# Patient Record
Sex: FEMALE | Race: WHITE | NOT HISPANIC OR LATINO | Employment: OTHER | URBAN - METROPOLITAN AREA
[De-identification: names, ages, dates, MRNs, and addresses within clinical notes are randomized per-mention and may not be internally consistent; named-entity substitution may affect disease eponyms.]

---

## 2021-04-13 DIAGNOSIS — Z23 ENCOUNTER FOR IMMUNIZATION: ICD-10-CM

## 2021-04-23 ENCOUNTER — APPOINTMENT (EMERGENCY)
Dept: RADIOLOGY | Facility: HOSPITAL | Age: 71
End: 2021-04-23
Payer: MEDICARE

## 2021-04-23 ENCOUNTER — HOSPITAL ENCOUNTER (EMERGENCY)
Facility: HOSPITAL | Age: 71
Discharge: HOME | End: 2021-04-23
Attending: EMERGENCY MEDICINE
Payer: MEDICARE

## 2021-04-23 VITALS
BODY MASS INDEX: 20.46 KG/M2 | RESPIRATION RATE: 12 BRPM | SYSTOLIC BLOOD PRESSURE: 199 MMHG | DIASTOLIC BLOOD PRESSURE: 122 MMHG | HEIGHT: 68 IN | OXYGEN SATURATION: 97 % | HEART RATE: 87 BPM | TEMPERATURE: 97.7 F | WEIGHT: 135 LBS

## 2021-04-23 DIAGNOSIS — S62.632B OPEN DISPLACED FRACTURE OF DISTAL PHALANX OF RIGHT MIDDLE FINGER, INITIAL ENCOUNTER: ICD-10-CM

## 2021-04-23 DIAGNOSIS — T14.8XXA ANIMAL BITE: ICD-10-CM

## 2021-04-23 DIAGNOSIS — S61.312A LACERATION OF RIGHT MIDDLE FINGER WITHOUT FOREIGN BODY WITH DAMAGE TO NAIL, INITIAL ENCOUNTER: Primary | ICD-10-CM

## 2021-04-23 PROCEDURE — 90471 IMMUNIZATION ADMIN: CPT

## 2021-04-23 PROCEDURE — 99283 EMERGENCY DEPT VISIT LOW MDM: CPT

## 2021-04-23 PROCEDURE — 99284 EMERGENCY DEPT VISIT MOD MDM: CPT | Performed by: NURSE PRACTITIONER

## 2021-04-23 PROCEDURE — 12042 INTMD RPR N-HF/GENIT2.6-7.5: CPT | Performed by: NURSE PRACTITIONER

## 2021-04-23 PROCEDURE — 73140 X-RAY EXAM OF FINGER(S): CPT

## 2021-04-23 PROCEDURE — 90715 TDAP VACCINE 7 YRS/> IM: CPT | Performed by: NURSE PRACTITIONER

## 2021-04-23 RX ORDER — HYDROCODONE BITARTRATE AND ACETAMINOPHEN 5; 325 MG/1; MG/1
1 TABLET ORAL ONCE
Status: COMPLETED | OUTPATIENT
Start: 2021-04-23 | End: 2021-04-23

## 2021-04-23 RX ORDER — BUPIVACAINE HYDROCHLORIDE 5 MG/ML
5 INJECTION, SOLUTION EPIDURAL; INTRACAUDAL ONCE
Status: COMPLETED | OUTPATIENT
Start: 2021-04-23 | End: 2021-04-23

## 2021-04-23 RX ORDER — AMOXICILLIN AND CLAVULANATE POTASSIUM 875; 125 MG/1; MG/1
1 TABLET, FILM COATED ORAL ONCE
Status: COMPLETED | OUTPATIENT
Start: 2021-04-23 | End: 2021-04-23

## 2021-04-23 RX ORDER — HYDROCODONE BITARTRATE AND ACETAMINOPHEN 5; 325 MG/1; MG/1
1 TABLET ORAL EVERY 6 HOURS PRN
Qty: 12 TABLET | Refills: 0 | Status: SHIPPED | OUTPATIENT
Start: 2021-04-23

## 2021-04-23 RX ORDER — LIDOCAINE HYDROCHLORIDE 10 MG/ML
5 INJECTION, SOLUTION EPIDURAL; INFILTRATION; INTRACAUDAL; PERINEURAL ONCE
Status: COMPLETED | OUTPATIENT
Start: 2021-04-23 | End: 2021-04-23

## 2021-04-23 RX ORDER — DIAZEPAM 5 MG/1
5 TABLET ORAL EVERY 6 HOURS PRN
Status: DISCONTINUED | OUTPATIENT
Start: 2021-04-23 | End: 2021-04-24 | Stop reason: HOSPADM

## 2021-04-23 RX ORDER — AMOXICILLIN AND CLAVULANATE POTASSIUM 875; 125 MG/1; MG/1
1 TABLET, FILM COATED ORAL 2 TIMES DAILY
Qty: 20 TABLET | Refills: 0 | Status: SHIPPED | OUTPATIENT
Start: 2021-04-23 | End: 2021-05-03

## 2021-04-23 RX ADMIN — LIDOCAINE HYDROCHLORIDE 5 ML: 10 INJECTION, SOLUTION EPIDURAL; INFILTRATION; INTRACAUDAL; PERINEURAL at 21:45

## 2021-04-23 RX ADMIN — DIAZEPAM 5 MG: 5 TABLET ORAL at 21:45

## 2021-04-23 RX ADMIN — HYDROCODONE BITARTRATE AND ACETAMINOPHEN 1 TABLET: 5; 325 TABLET ORAL at 22:46

## 2021-04-23 RX ADMIN — BUPIVACAINE HYDROCHLORIDE 5 ML: 5 INJECTION, SOLUTION EPIDURAL; INTRACAUDAL; PERINEURAL at 21:45

## 2021-04-23 RX ADMIN — TETANUS TOXOID, REDUCED DIPHTHERIA TOXOID AND ACELLULAR PERTUSSIS VACCINE, ADSORBED 0.5 ML: 5; 2.5; 8; 8; 2.5 SUSPENSION INTRAMUSCULAR at 21:45

## 2021-04-23 RX ADMIN — AMOXICILLIN AND CLAVULANATE POTASSIUM 1 TABLET: 875; 125 TABLET, FILM COATED ORAL at 22:46

## 2021-04-24 ENCOUNTER — TELEPHONE (OUTPATIENT)
Dept: OBGYN CLINIC | Facility: HOSPITAL | Age: 71
End: 2021-04-24

## 2021-04-24 NOTE — ED PROVIDER NOTES
History  Chief Complaint   Patient presents with    Dog Bite     PT states she was reaching into the dogs mouth when the dog bit the tip of her right middle finger   Finger Laceration     This is a 61-year-old female that accidentally sustained a dog bite to her right middle finger  It was her own dog she was reaching to take a bone out of the dog's mouth that he was choking on  Somehow her finger had been bitten  She has a near complete distal finger tip avulsion  Will obtain films to evaluate for osseous injury  There was damage to the nail as well      Dog Bite  Associated symptoms: no fever and no rash    Finger Laceration  Associated symptoms: no fever and no rash        None       History reviewed  No pertinent past medical history  History reviewed  No pertinent surgical history  History reviewed  No pertinent family history  I have reviewed and agree with the history as documented  E-Cigarette/Vaping     E-Cigarette/Vaping Substances     Social History     Tobacco Use    Smoking status: Light Tobacco Smoker    Smokeless tobacco: Never Used   Substance Use Topics    Alcohol use: Yes     Frequency: Monthly or less     Drinks per session: 1 or 2    Drug use: Not on file       Review of Systems   Constitutional: Negative for diaphoresis, fatigue and fever  HENT: Negative for congestion, ear pain, nosebleeds and sore throat  Eyes: Negative for photophobia, pain, discharge and visual disturbance  Respiratory: Negative for cough, choking, chest tightness, shortness of breath and wheezing  Cardiovascular: Negative for chest pain and palpitations  Gastrointestinal: Negative for abdominal distention, abdominal pain, diarrhea and vomiting  Genitourinary: Negative for dysuria, flank pain and frequency  Musculoskeletal: Negative for back pain, gait problem and joint swelling  Skin: Positive for wound  Negative for color change and rash     Neurological: Negative for dizziness, syncope and headaches  Psychiatric/Behavioral: Negative for behavioral problems and confusion  The patient is not nervous/anxious  All other systems reviewed and are negative  Physical Exam  Physical Exam  Vitals signs and nursing note reviewed  Constitutional:       General: She is not in acute distress  Appearance: She is well-developed  She is not ill-appearing or toxic-appearing  HENT:      Head: Normocephalic and atraumatic  Mouth/Throat:      Dentition: Normal dentition  Eyes:      General:         Right eye: No discharge  Left eye: No discharge  Neck:      Musculoskeletal: Normal range of motion and neck supple  Cardiovascular:      Rate and Rhythm: Normal rate and regular rhythm  Pulmonary:      Effort: Pulmonary effort is normal  No accessory muscle usage or respiratory distress  Abdominal:      General: There is no distension  Tenderness: There is no guarding  Musculoskeletal: Normal range of motion  Skin:     General: Skin is warm and dry  Comments: There is skin avulsion to the right middle finger distally with approximately half of the distal finger tip avulsed  Neurological:      Mental Status: She is alert and oriented to person, place, and time  Coordination: Coordination normal    Psychiatric:         Behavior: Behavior is cooperative           Vital Signs  ED Triage Vitals   Temperature Pulse Respirations Blood Pressure SpO2   04/23/21 2100 04/23/21 2100 04/23/21 2100 04/23/21 2100 04/23/21 2100   97 7 °F (36 5 °C) 87 12 (!) 199/122 97 %      Temp Source Heart Rate Source Patient Position - Orthostatic VS BP Location FiO2 (%)   04/23/21 2100 04/23/21 2100 04/23/21 2100 04/23/21 2100 --   Oral Monitor Sitting Left arm       Pain Score       04/23/21 2246       6           Vitals:    04/23/21 2100   BP: (!) 199/122   Pulse: 87   Patient Position - Orthostatic VS: Sitting         Visual Acuity      ED Medications  Medications   diazepam (VALIUM) tablet 5 mg (5 mg Oral Given 4/23/21 2145)   bupivacaine (PF) (MARCAINE) 0 5 % injection 5 mL (5 mL Infiltration Given 4/23/21 2145)   tetanus-diphtheria-acellular pertussis (BOOSTRIX) IM injection 0 5 mL (0 5 mL Intramuscular Given 4/23/21 2145)   lidocaine (PF) (XYLOCAINE-MPF) 1 % injection 5 mL (5 mL Infiltration Given 4/23/21 2145)   amoxicillin-clavulanate (AUGMENTIN) 875-125 mg per tablet 1 tablet (1 tablet Oral Given 4/23/21 2246)   HYDROcodone-acetaminophen (NORCO) 5-325 mg per tablet 1 tablet (1 tablet Oral Given 4/23/21 2246)       Diagnostic Studies  Results Reviewed     None                 XR finger third digit-middle RIGHT    (Results Pending)              Procedures  Laceration repair    Date/Time: 4/23/2021 11:33 PM  Performed by: CRISTIAN Robles  Authorized by: CRISTIAN Robles   Consent: The procedure was performed in an emergent situation  Verbal consent obtained    Risks and benefits: risks, benefits and alternatives were discussed  Consent given by: patient  Patient understanding: patient states understanding of the procedure being performed  Patient identity confirmed: verbally with patient and arm band  Body area: upper extremity  Location details: right long finger  Laceration length: 3 cm  Vascular damage: yes  Anesthesia: digital block    Anesthesia:  Local Anesthetic: lidocaine 1% without epinephrine and bupivacaine 0 5% without epinephrine  Anesthetic total: 7 mL      Procedure Details:  Irrigation solution: saline  Irrigation method: tap  Amount of cleaning: standard  Debridement: none  Degree of undermining: none  Skin closure: 5-0 Prolene (3)  Mucous membrane closure: 5-0 Chromic gut (2)  Number of sutures: 5  Approximation: close  Approximation difficulty: complex  Dressing: non-adhesive packing strip               ED Course                             SBIRT 20yo+      Most Recent Value   SBIRT (24 yo +)   In order to provide better care to our patients, we are screening all of our patients for alcohol and drug use  Would it be okay to ask you these screening questions? Yes Filed at: 04/23/2021 2109   Initial Alcohol Screen: US AUDIT-C    1  How often do you have a drink containing alcohol?  0 Filed at: 04/23/2021 2109   2  How many drinks containing alcohol do you have on a typical day you are drinking? 0 Filed at: 04/23/2021 2109   3a  Male UNDER 65: How often do you have five or more drinks on one occasion? 0 Filed at: 04/23/2021 2109   3b  FEMALE Any Age, or MALE 65+: How often do you have 4 or more drinks on one occassion? 0 Filed at: 04/23/2021 2109   Audit-C Score  0 Filed at: 04/23/2021 2109   CARMELO: How many times in the past year have you    Used an illegal drug or used a prescription medication for non-medical reasons? Never Filed at: 04/23/2021 2109                    MDM  Number of Diagnoses or Management Options  Animal bite: new and requires workup  Laceration of right middle finger without foreign body with damage to nail, initial encounter: new and requires workup  Open displaced fracture of distal phalanx of right middle finger, initial encounter: new and requires workup  Diagnosis management comments: Pretty complex wound repair  Patient will need follow-up with orthopedic hands this upcoming week preferably Monday or Tuesday  Advised to keep dressing in place until that time  If unable to get an appointment by that time then recommend re-dressing  Likely will lose nail which the patient does understand  There is some pinkness to the distal finger tip so hopefully this will take         Amount and/or Complexity of Data Reviewed  Tests in the radiology section of CPT®: reviewed and ordered  Independent visualization of images, tracings, or specimens: yes    Patient Progress  Patient progress: stable      Disposition  Final diagnoses:   Laceration of right middle finger without foreign body with damage to nail, initial encounter   Animal bite   Open displaced fracture of distal phalanx of right middle finger, initial encounter     Time reflects when diagnosis was documented in both MDM as applicable and the Disposition within this note     Time User Action Codes Description Comment    4/23/2021 10:43 PM Rogenia Getting Add [A07 849K] Laceration of right middle finger without foreign body with damage to nail, initial encounter     4/23/2021 10:46 PM Rogenia Getting Add [U08 [id number],  W59 01XA] Bite of nonvenomous snakes and lizards, initial encounter     4/23/2021 10:46 PM Angela Garcia 42  [id number]] Animal bite     4/23/2021 10:46 PM Rogenia Getting Add [S62 [id number]] Open displaced fracture of distal phalanx of right middle finger, initial encounter     4/23/2021 11:37 PM Keenanenia Getting Remove [B96 [id number],  W59 01XA] Bite of nonvenomous snakes and lizards, initial encounter       ED Disposition     ED Disposition Condition Date/Time Comment    Discharge Stable Fri Apr 23, 2021 10:48 PM Rima Sham discharge to home/self care  Follow-up Information     Follow up With Specialties Details Why Contact Info    Andriy Maxwell MD Orthopedic Surgery, Hand Surgery, Orthopedics Schedule an appointment as soon as possible for a visit  For Continued Evaluation [address] [place] [id number]  [phone]            Discharge Medication List as of 4/23/2021 10:48 PM      START taking these medications    Details   amoxicillin-clavulanate (AUGMENTIN) 875-125 mg per tablet Take 1 tablet by mouth 2 (two) times a day for 10 days, Starting Fri 4/23/2021, Until Mon 5/3/2021, Normal      HYDROcodone-acetaminophen (NORCO) 5-325 mg per tablet Take 1 tablet by mouth every 6 (six) hours as needed for pain for up to 12 dosesMax Daily Amount: 4 tablets, Starting Fri 4/23/2021, Normal           No discharge procedures on file      PDMP Review     None          ED Provider  Electronically Signed by           CRISTIAN Henderson  04/23/21 5591

## 2021-04-24 NOTE — TELEPHONE ENCOUNTER
Patient is calling in wanting to make an appointment with Dr Alcantara Links for Monday or Tuesday in any location for a laceration to her right middle finger  The patient does have stitches to the area  Per ED the patient was advised to be seen Monday or Tuesday   does not have anything available so information forwarded over to practice admin to assist further          Call back# [phone]

## 2021-04-26 ENCOUNTER — OFFICE VISIT (OUTPATIENT)
Dept: OCCUPATIONAL THERAPY | Facility: MEDICAL CENTER | Age: 71
End: 2021-04-26
Payer: MEDICARE

## 2021-04-26 ENCOUNTER — OFFICE VISIT (OUTPATIENT)
Dept: OBGYN CLINIC | Facility: MEDICAL CENTER | Age: 71
End: 2021-04-26
Payer: MEDICARE

## 2021-04-26 VITALS
HEIGHT: 68 IN | WEIGHT: 148.6 LBS | DIASTOLIC BLOOD PRESSURE: 115 MMHG | HEART RATE: 83 BPM | SYSTOLIC BLOOD PRESSURE: 173 MMHG | BODY MASS INDEX: 22.52 KG/M2
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
## 2021-04-26 PROCEDURE — L3933 FO W/O JOINTS CF: HCPCS

## 2021-04-26 PROCEDURE — 99203 OFFICE O/P NEW LOW 30 MIN: CPT | Performed by: ORTHOPAEDIC SURGERY

## 2021-04-26 NOTE — PROGRESS NOTES
CHIEF COMPLAINT:  Chief Complaint   Patient presents with    Right Middle Finger - Pain       SUBJECTIVE:  Amanda Mondragon is a 72y o  year old female who presents  Right long finger pain  Patient states on 04/23/2021 she reached into her dog's mouth to get out a bone when he bit down on her finger with his back molars  She went to the emergency room for evaluation of which the laceration was sutured back together  She was advised to follow up with Orthopedics  She was given antibiotics as well  Today she states she has some discomfort over the distal phalanx  She is able to move the PIP and MCP joint with minimal discomfort  She denies any numbness or tingling  PAST MEDICAL HISTORY:  History reviewed  No pertinent past medical history  PAST SURGICAL HISTORY:  History reviewed  No pertinent surgical history  FAMILY HISTORY:  History reviewed  No pertinent family history      SOCIAL HISTORY:  Social History     Tobacco Use    Smoking status: Light Tobacco Smoker    Smokeless tobacco: Never Used   Substance Use Topics    Alcohol use: Yes     Frequency: Monthly or less     Drinks per session: 1 or 2    Drug use: Not on file       MEDICATIONS:    Current Outpatient Medications:     amoxicillin-clavulanate (AUGMENTIN) 875-125 mg per tablet, Take 1 tablet by mouth 2 (two) times a day for 10 days, Disp: 20 tablet, Rfl: 0    Ibuprofen (ADVIL PO), Take by mouth, Disp: , Rfl:     HYDROcodone-acetaminophen (NORCO) 5-325 mg per tablet, Take 1 tablet by mouth every 6 (six) hours as needed for pain for up to 12 dosesMax Daily Amount: 4 tablets (Patient not taking: Reported on 4/26/2021), Disp: 12 tablet, Rfl: 0    ALLERGIES:  Allergies   Allergen Reactions    Penicillin G Other (See Comments)     Other reaction(s): rash on palms       REVIEW OF SYSTEMS:  Review of Systems    ROS:   General: no fever, no chills  HEENT:  No loss of hearing or eyesight problems  Eyes:  No red eyes  Respiratory:  No coughing, shortness of breath or wheezing  Cardiovascular:  No chest pain, no palpitations  GI:  Abdomen soft nontender, denies nausea  Endocrine:  No muscle weakness, no frequent urination, no excessive thirst  Urinary:  No dysuria, no incontinence  Musculoskeletal: see HPI and PE  SKIN:  No skin rash, no dry skin  Neurological:  No headaches, no confusion  Psychiatric:  No suicide thoughts, no anxiety, no depression  Review of all other systems is negative    VITALS:  Vitals:    04/26/21 1106   BP: (!) 173/115   Pulse: 83       LABS:  HgA1c: No results found for: HGBA1C  BMP: No results found for: GLUCOSE, CALCIUM, NA, K, CO2, CL, BUN, CREATININE    _____________________________________________________  PHYSICAL EXAMINATION:  General: well developed and well nourished, alert, oriented times 3 and appears comfortable  Psychiatric: Normal  HEENT: Trachea Midline, No torticollis  Pulmonary: No audible wheezing or strider  Cardiovascular: No discernable arrhythmia   Skin: No masses, erythema, lacerations, fluctation, ulcerations  Neurovascular: Sensation Intact to the Median, Ulnar, Radial Nerve, Motor Intact to the Median, Ulnar, Radial Nerve and Pulses Intact    MUSCULOSKELETAL EXAMINATION:    Right long finger   Laceration is noted over the nail with sutures intact   Mild fullness noted over the distal phalanx   Tenderness to palpation over the distal phalanx  She is able to actively flex and extend the PIP and the  MCP joint  No active drainage from the wound, no signs or symptoms of infection    ___________________________________________________  STUDIES REVIEWED:  Images obtained on 04/23/2021 of the  right long finger 3 views demonstrate  distal phalanx fracture noted      PROCEDURES PERFORMED:  Procedures  No Procedures performed today    _____________________________________________________  ASSESSMENT/PLAN:    Right long finger distal phalanx laceration after dog bite  - Patient was advised to continue taking antibiotics as prescribed   - she can obtain a finger tip protection splint and home exercise program from therapy  -she will follow-up in 1 week for removal of sutures    Follow Up:  Return in about 1 week (around 5/3/2021) for in [place]   Work/school status:   no restrictions    To Do Next Visit:  Re-evaluation of current issue,  Sutures out    Scribe Attestation    I,:  Renata Gross PA-C am acting as a scribe while in the presence of the attending physician :       I,:  Andrew Salmeron MD personally performed the services described in this documentation    as scribed in my presence :           Portions of the record may have been created with voice recognition software  Occasional wrong word or "sound a like" substitutions may have occurred due to the inherent limitations of voice recognition software  Read the chart carefully and recognize, using context, where substitutions have occurred

## 2021-04-26 NOTE — PROGRESS NOTES
Orthosis    Diagnosis:   1  Dog bite, initial encounter  Ambulatory referral to PT/OT hand therapy     Indication: Motion Blocking    Location: Right  long finger  Supplies: Custom Fit Orthotic  Orthosis type: Cap splint   Wearing Schedule: With Activity Only  Describe Position: neutral extension    Precautions: Universal (skin contact/breakdown)    Patient or Caregiver expresses understanding of wearing Schedule and Precautions? Yes  Patient or Caregiver able to don/doff orthotic independently? Yes    Written orders provided to patient?  No  Orders Obtained: Written  Orders Obtained from: Dr Kaylene Bennett    Return for evaluation and treatment No

## 2021-05-06 ENCOUNTER — OFFICE VISIT (OUTPATIENT)
Dept: OBGYN CLINIC | Facility: CLINIC | Age: 71
End: 2021-05-06
Payer: MEDICARE

## 2021-05-06 VITALS
WEIGHT: 148 LBS | HEIGHT: 68 IN | SYSTOLIC BLOOD PRESSURE: 172 MMHG | DIASTOLIC BLOOD PRESSURE: 108 MMHG | BODY MASS INDEX: 22.43 KG/M2 | HEART RATE: 102 BPM
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
## 2021-05-06 PROCEDURE — 99213 OFFICE O/P EST LOW 20 MIN: CPT | Performed by: ORTHOPAEDIC SURGERY

## 2021-05-06 NOTE — PROGRESS NOTES
CHIEF COMPLAINT:  Chief Complaint   Patient presents with    Right Middle Finger - Follow-up       SUBJECTIVE:  Ray Hernández is a 72y o  year old  female who presents to the office for a follow up for right long finger laceration with tuft fracture  after dog bite sustained 4/23/2021  Pt was seen in the office 4/26/2021 where she was advised to finish her oral ABX and order was placed for finger tip protection splint and HEP  Pt states that she has finished her ABX  Pt states that she no longer has any pain  Pt states that she has continued splint out of the house and with activities  PAST MEDICAL HISTORY:  History reviewed  No pertinent past medical history  PAST SURGICAL HISTORY:  No past surgical history on file  FAMILY HISTORY:  History reviewed  No pertinent family history  SOCIAL HISTORY:  Social History     Tobacco Use    Smoking status: Light Tobacco Smoker    Smokeless tobacco: Never Used   Substance Use Topics    Alcohol use: Yes     Frequency: Monthly or less     Drinks per session: 1 or 2    Drug use: Not on file       MEDICATIONS:    Current Outpatient Medications:     Ibuprofen (ADVIL PO), Take by mouth, Disp: , Rfl:     HYDROcodone-acetaminophen (NORCO) 5-325 mg per tablet, Take 1 tablet by mouth every 6 (six) hours as needed for pain for up to 12 dosesMax Daily Amount: 4 tablets (Patient not taking: Reported on 5/6/2021), Disp: 12 tablet, Rfl: 0    ALLERGIES:  Allergies   Allergen Reactions    Penicillin G Other (See Comments)     Other reaction(s): rash on palms       REVIEW OF SYSTEMS:  Review of Systems   Constitutional: Negative for chills, fever and unexpected weight change  HENT: Negative for hearing loss, nosebleeds and sore throat  Eyes: Negative for pain, redness and visual disturbance  Respiratory: Negative for cough, shortness of breath and wheezing  Cardiovascular: Negative for chest pain, palpitations and leg swelling     Gastrointestinal: Negative for abdominal pain, nausea and vomiting  Endocrine: Negative for polydipsia and polyuria  Genitourinary: Negative for dysuria and hematuria  Skin: Negative for rash and wound  Neurological: Negative for dizziness and headaches  Psychiatric/Behavioral: Negative for decreased concentration, dysphoric mood and suicidal ideas  The patient is not nervous/anxious  VITALS:  Vitals:    05/06/21 1447   BP: (!) 172/108   Pulse: 102       LABS:  HgA1c: No results found for: HGBA1C  BMP: No results found for: GLUCOSE, CALCIUM, NA, K, CO2, CL, BUN, CREATININE    _____________________________________________________  PHYSICAL EXAMINATION:  General: well developed and well nourished, alert, oriented times 3 and appears comfortable  Psychiatric: Normal  HEENT: Trachea Midline, No torticollis  Pulmonary: No audible wheezing or strider  Cardiovascular: No discernable arrhythmia   Skin: No masses, erythema, lacerations, fluctation, ulcerations  Neurovascular: Sensation Intact to the Median, Ulnar, Radial Nerve, Motor Intact to the Median, Ulnar, Radial Nerve and Pulses Intact    MUSCULOSKELETAL EXAMINATION:  Right long finger   Nail cracked vs new nail growth   Continued proximal nail and surrounding tissue ecchymosis  Sutures intact     ___________________________________________________  STUDIES REVIEWED:  No studies reviewed         PROCEDURES PERFORMED:  Procedures  No Procedures performed today    _____________________________________________________  ASSESSMENT/PLAN:  Right long finger distal phalanx laceration with tuft fracture from dog bite 4/23/2021  - sutures were removed today without complications and dissolvable sutures were trimmed  - pt was advised to continue to avoid submerging her finger in any water   - Pt was advised to continue to wash hand and shower   - Pt was advised to continue splint with activity for protection  - Pt was advised to cover nail at night if she notes her nail lifting to avoid getting snagged on covers  - Pt was advised to follow up in the office in 3 weeks but to call the office if she has any questions or concerns         Follow Up:  No follow-ups on file          To Do Next Visit:  Re-evaluation of current issue        Scribe Attestation    I,:  Judy Zambrano am acting as a scribe while in the presence of the attending physician :       I,:  Paola De Leon MD personally performed the services described in this documentation    as scribed in my presence :

## 2021-05-27 ENCOUNTER — OFFICE VISIT (OUTPATIENT)
Dept: OBGYN CLINIC | Facility: CLINIC | Age: 71
End: 2021-05-27
Payer: MEDICARE

## 2021-05-27 VITALS
WEIGHT: 143 LBS | HEIGHT: 65 IN | BODY MASS INDEX: 23.82 KG/M2 | HEART RATE: 66 BPM | RESPIRATION RATE: 18 BRPM | DIASTOLIC BLOOD PRESSURE: 97 MMHG | SYSTOLIC BLOOD PRESSURE: 155 MMHG
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
## 2021-05-27 PROCEDURE — 99213 OFFICE O/P EST LOW 20 MIN: CPT | Performed by: ORTHOPAEDIC SURGERY

## 2021-05-27 NOTE — PROGRESS NOTES
CHIEF COMPLAINT:  Chief Complaint   Patient presents with    Right Middle Finger - Follow-up       SUBJECTIVE:  Julia Anderson is a 72y o  year old  female who presents for follow-up regarding right long finger laceration with tuft fracture after dog bite sustained on 4/23/2021  Patient stated that she has been covering the nail when leaving the house to avoid snags  PAST MEDICAL HISTORY:  Past Medical History:   Diagnosis Date    Cancer [facility])     breast       PAST SURGICAL HISTORY:  Past Surgical History:   Procedure Laterality Date    BREAST LUMPECTOMY      ECTOPIC PREGNANCY SURGERY      JOINT REPLACEMENT      hip       FAMILY HISTORY:  Family History   Problem Relation Age of Onset    Dementia Mother     Cancer Father     Neuropathy Brother        SOCIAL HISTORY:  Social History     Tobacco Use    Smoking status: Light Tobacco Smoker    Smokeless tobacco: Never Used   Substance Use Topics    Alcohol use: Yes     Frequency: Monthly or less     Drinks per session: 1 or 2    Drug use: Never       MEDICATIONS:    Current Outpatient Medications:     Ibuprofen (ADVIL PO), Take by mouth, Disp: , Rfl:     HYDROcodone-acetaminophen (NORCO) 5-325 mg per tablet, Take 1 tablet by mouth every 6 (six) hours as needed for pain for up to 12 dosesMax Daily Amount: 4 tablets (Patient not taking: Reported on 5/27/2021), Disp: 12 tablet, Rfl: 0    ALLERGIES:  Allergies   Allergen Reactions    Penicillin G Other (See Comments)     Other reaction(s): rash on palms       REVIEW OF SYSTEMS:  Review of Systems   Constitutional: Negative for chills and fever  HENT: Negative for ear pain and sore throat  Eyes: Negative for pain and visual disturbance  Respiratory: Negative for cough and shortness of breath  Cardiovascular: Negative for chest pain and palpitations  Gastrointestinal: Negative for abdominal pain and vomiting  Genitourinary: Negative for dysuria and hematuria     Musculoskeletal: Positive for arthralgias  Negative for back pain  Skin: Negative for color change and rash  Neurological: Negative for seizures and syncope  All other systems reviewed and are negative  VITALS:  Vitals:    05/27/21 0826   BP: 155/97   Pulse: 66   Resp: 18       LABS:  HgA1c: No results found for: HGBA1C  BMP: No results found for: GLUCOSE, CALCIUM, NA, K, CO2, CL, BUN, CREATININE    _____________________________________________________  PHYSICAL EXAMINATION:  General: well developed and well nourished, alert, oriented times 3 and appears comfortable  Psychiatric: Normal  HEENT: Trachea Midline, No torticollis  Pulmonary: No audible wheezing or respiratory distress   Skin: No masses, erythema, lacerations, fluctation, ulcerations  Neurovascular: Sensation Intact to the Median, Ulnar, Radial Nerve, Motor Intact to the Median, Ulnar, Radial Nerve and Pulses Intact    MUSCULOSKELETAL EXAMINATION:  Right long finger:   Nail cracked vs new nail growth   Continued proximal nail ecchymosis   Sensation intact     ___________________________________________________  STUDIES REVIEWED:  No studies reviewed  PROCEDURES PERFORMED:  Procedures  No Procedures performed today    _____________________________________________________  ASSESSMENT/PLAN:    72 y o  female with right long finger distal phalanx laceration with tuft fracture from dog bite sustained on 4/23/2021    -I discussed that she should continue to wrap the nail with [place] to avoid snagging or ripping off the nail    -I will see her back in the office on an as needed basis if symptoms worsen or fail to improve  Diagnoses and all orders for this visit:    Dog bite, subsequent encounter          Follow Up:  Return if symptoms worsen or fail to improve      Work/school status:  No restrictions     To Do Next Visit:    PRN      Scribe Attestation    I,:  All Rajesh am acting as a scribe while in the presence of the attending physician :       I,:  Demetri Linares Alea Sullivan MD personally performed the services described in this documentation    as scribed in my presence :           Portions of the record may have been created with voice recognition software  Occasional wrong word or "sound a like" substitutions may have occurred due to the inherent limitations of voice recognition software  Read the chart carefully and recognize, using context, where substitutions have occurred